# Patient Record
Sex: MALE | Race: WHITE | NOT HISPANIC OR LATINO | URBAN - METROPOLITAN AREA
[De-identification: names, ages, dates, MRNs, and addresses within clinical notes are randomized per-mention and may not be internally consistent; named-entity substitution may affect disease eponyms.]

---
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
## 2025-07-04 NOTE — ASU DISCHARGE PLAN (ADULT/PEDIATRIC) - SPECIFY DIET AND FLUID
No fried, spicy or greasy foods. Increase fluids as tolerated  If your doctor prescribed narcotic pain medications after your procedure to help prevent and reduce constipation, increase fluid intake and fiber intake in your diet. You may take OTC Stool Softeners such as Colace to help reduce constipation. No indicators present